# Patient Record
Sex: MALE | Race: WHITE | Employment: FULL TIME | URBAN - METROPOLITAN AREA
[De-identification: names, ages, dates, MRNs, and addresses within clinical notes are randomized per-mention and may not be internally consistent; named-entity substitution may affect disease eponyms.]

---

## 2018-09-12 PROCEDURE — 86780 TREPONEMA PALLIDUM: CPT | Performed by: FAMILY MEDICINE

## 2018-09-12 PROCEDURE — 87389 HIV-1 AG W/HIV-1&-2 AB AG IA: CPT | Performed by: FAMILY MEDICINE

## 2019-06-22 PROCEDURE — 87086 URINE CULTURE/COLONY COUNT: CPT | Performed by: FAMILY MEDICINE

## 2024-01-10 ENCOUNTER — OFFICE VISIT (OUTPATIENT)
Dept: FAMILY MEDICINE CLINIC | Facility: CLINIC | Age: 49
End: 2024-01-10
Payer: COMMERCIAL

## 2024-01-10 VITALS
HEIGHT: 70 IN | OXYGEN SATURATION: 97 % | RESPIRATION RATE: 18 BRPM | BODY MASS INDEX: 31.5 KG/M2 | WEIGHT: 220 LBS | HEART RATE: 92 BPM | TEMPERATURE: 99 F | DIASTOLIC BLOOD PRESSURE: 89 MMHG | SYSTOLIC BLOOD PRESSURE: 145 MMHG

## 2024-01-10 DIAGNOSIS — J34.89 SINUS PRESSURE: Primary | ICD-10-CM

## 2024-01-10 DIAGNOSIS — R03.0 ELEVATED BLOOD PRESSURE READING: ICD-10-CM

## 2024-01-10 PROCEDURE — 99202 OFFICE O/P NEW SF 15 MIN: CPT

## 2024-01-10 PROCEDURE — 3077F SYST BP >= 140 MM HG: CPT

## 2024-01-10 PROCEDURE — 3079F DIAST BP 80-89 MM HG: CPT

## 2024-01-10 PROCEDURE — 3008F BODY MASS INDEX DOCD: CPT

## 2024-01-11 NOTE — PROGRESS NOTES
CHIEF COMPLAINT:     Chief Complaint   Patient presents with    Sinus Problem     Tooth ache, sinus pressure, headache , and right ear pain. For 3 days   OTC: muciex and aspirin        HPI:   Nghia Arevalo is a 48 year old male who presents for upper respiratory symptoms for  3 days. Patient reports ear pain, sinus pain, headache and dental pain, denies fever. Symptoms have been without change since onset.  Treating symptoms with ibuprofen and mucinex. Denies any known exposures or other members in house sick with similar symptoms.      Current Outpatient Medications   Medication Sig Dispense Refill    Multiple Vitamin (ONE-DAILY MULTI VITAMINS) Oral Tab Take 1 tablet by mouth.        History reviewed. No pertinent past medical history.   Past Surgical History:   Procedure Laterality Date    TONSILLECTOMY      VASECTOMY           Social History     Socioeconomic History    Marital status:    Tobacco Use    Smoking status: Never    Smokeless tobacco: Never   Substance and Sexual Activity    Alcohol use: Yes     Comment: social          REVIEW OF SYSTEMS:   GENERAL: no change in appetite  SKIN: no rashes or abnormal skin lesions  HEENT: See HPI  LUNGS: See HPI  CARDIOVASCULAR: denies chest pain or palpitations   GI: denies N/V/C or abdominal pain      EXAM:   /89 (BP Location: Right arm, Patient Position: Sitting, Cuff Size: adult)   Pulse 92   Temp 98.5 °F (36.9 °C)   Resp 18   Ht 5' 10\" (1.778 m)   Wt 220 lb (99.8 kg)   SpO2 97%   BMI 31.57 kg/m²   GENERAL: well developed, well nourished,in no apparent distress  SKIN: no rashes,no suspicious lesions  HEAD: atraumatic, normocephalic.  Positive tenderness on palpation of right maxillary sinuses  EYES: conjunctiva clear, EOM intact  EARS: TM's pearly and intact, no bulging, no retraction,no fluid, bony landmarks visualized  NOSE: Nostrils patent, no nasal discharge, nasal mucosa non-inflamed   THROAT: Oral mucosa pink, moist. Posterior pharynx is  non erythematous. no exudates. Tonsils absent.    NECK: Supple, non-tender  LUNGS: clear to auscultation bilaterally, no wheezes or rhonchi. Breathing is non labored.  CARDIO: RRR without murmur  EXTREMITIES: no cyanosis, clubbing or edema  LYMPH:  no lymphadenopathy.        ASSESSMENT AND PLAN:   Nghia Arevalo is a 48 year old male who presents with upper respiratory symptoms that are consistent with    ASSESSMENT:   Encounter Diagnoses   Name Primary?    Sinus pressure Yes    Elevated blood pressure reading        PLAN: Meds as below.  Comfort care as described in Patient Instructions. Discussion about supportive treatment including over the counter medications, hydration, humidifier and rest. Patient was shoveling snow prior to arrival and attributed elevated BP to that. Instructed to follow up with PCP.    Meds & Refills for this Visit:  Requested Prescriptions      No prescriptions requested or ordered in this encounter     Risks, benefits, and side effects of medication explained and discussed.    The patient indicates understanding of these issues and agrees to the plan.  The patient is asked to f/u with PCP if sx's persist or worsen.  There are no Patient Instructions on file for this visit.